# Patient Record
Sex: MALE | URBAN - METROPOLITAN AREA
[De-identification: names, ages, dates, MRNs, and addresses within clinical notes are randomized per-mention and may not be internally consistent; named-entity substitution may affect disease eponyms.]

---

## 2019-07-03 ENCOUNTER — OFFICE VISIT (OUTPATIENT)
Dept: URBAN - METROPOLITAN AREA CLINIC 40 | Facility: CLINIC | Age: 49
End: 2019-07-03
Payer: COMMERCIAL

## 2019-07-03 DIAGNOSIS — H52.4 PRESBYOPIA: Primary | ICD-10-CM

## 2019-07-03 PROCEDURE — 92004 COMPRE OPH EXAM NEW PT 1/>: CPT | Performed by: OPTOMETRIST

## 2019-07-03 ASSESSMENT — KERATOMETRY
OS: 42.00
OD: 42.50

## 2019-07-03 ASSESSMENT — VISUAL ACUITY
OD: 20/20
OS: 20/20

## 2019-07-03 ASSESSMENT — INTRAOCULAR PRESSURE
OS: 12
OD: 12

## 2021-05-20 ENCOUNTER — OFFICE VISIT (OUTPATIENT)
Dept: URBAN - METROPOLITAN AREA CLINIC 40 | Facility: CLINIC | Age: 51
End: 2021-05-20
Payer: COMMERCIAL

## 2021-05-20 PROCEDURE — 92014 COMPRE OPH EXAM EST PT 1/>: CPT | Performed by: OPTOMETRIST

## 2021-05-20 ASSESSMENT — VISUAL ACUITY
OD: 20/20
OS: 20/20

## 2021-05-20 ASSESSMENT — INTRAOCULAR PRESSURE
OD: 13
OS: 12

## 2022-11-16 ENCOUNTER — OFFICE VISIT (OUTPATIENT)
Dept: URBAN - METROPOLITAN AREA CLINIC 40 | Facility: CLINIC | Age: 52
End: 2022-11-16
Payer: COMMERCIAL

## 2022-11-16 DIAGNOSIS — H52.4 PRESBYOPIA: Primary | ICD-10-CM

## 2022-11-16 PROCEDURE — 92014 COMPRE OPH EXAM EST PT 1/>: CPT | Performed by: OPTOMETRIST

## 2022-11-16 ASSESSMENT — VISUAL ACUITY
OS: 20/20
OD: 20/20

## 2022-11-16 ASSESSMENT — INTRAOCULAR PRESSURE
OD: 12
OS: 12

## 2022-11-16 ASSESSMENT — KERATOMETRY
OD: 42.13
OS: 41.50

## 2023-12-13 ENCOUNTER — OFFICE VISIT (OUTPATIENT)
Dept: URBAN - METROPOLITAN AREA CLINIC 40 | Facility: CLINIC | Age: 53
End: 2023-12-13
Payer: COMMERCIAL

## 2023-12-13 DIAGNOSIS — H52.4 PRESBYOPIA: Primary | ICD-10-CM

## 2023-12-13 PROCEDURE — 92014 COMPRE OPH EXAM EST PT 1/>: CPT | Performed by: OPTOMETRIST

## 2023-12-13 ASSESSMENT — KERATOMETRY
OD: 41.63
OS: 41.38

## 2023-12-13 ASSESSMENT — VISUAL ACUITY
OD: 20/20
OS: 20/20

## 2023-12-13 ASSESSMENT — INTRAOCULAR PRESSURE
OD: 16
OS: 16

## 2024-04-03 NOTE — IMPRESSION/PLAN
Impression: Presbyopia: H52.4. Plan: Discussed diagnosis in detail with patient. New glasses Rx was given today. Recommend UV protection. Potential for initial adaptation discussed.
february 2024

## 2025-03-26 ENCOUNTER — OFFICE VISIT (OUTPATIENT)
Dept: URBAN - METROPOLITAN AREA CLINIC 40 | Facility: CLINIC | Age: 55
End: 2025-03-26
Payer: COMMERCIAL

## 2025-03-26 DIAGNOSIS — H52.4 PRESBYOPIA: Primary | ICD-10-CM

## 2025-03-26 PROCEDURE — 92014 COMPRE OPH EXAM EST PT 1/>: CPT | Performed by: OPTOMETRIST

## 2025-03-26 ASSESSMENT — KERATOMETRY
OD: 41.63
OS: 42.00

## 2025-03-26 ASSESSMENT — INTRAOCULAR PRESSURE
OS: 16
OD: 17

## 2025-03-26 ASSESSMENT — VISUAL ACUITY
OS: 20/20
OD: 20/20

## 2025-05-07 ENCOUNTER — OFFICE VISIT (OUTPATIENT)
Dept: URBAN - METROPOLITAN AREA CLINIC 40 | Facility: CLINIC | Age: 55
End: 2025-05-07
Payer: COMMERCIAL

## 2025-05-07 DIAGNOSIS — H52.4 PRESBYOPIA: Primary | ICD-10-CM

## 2025-05-07 PROCEDURE — 92015 DETERMINE REFRACTIVE STATE: CPT | Performed by: OPTOMETRIST

## 2025-05-07 ASSESSMENT — VISUAL ACUITY
OD: 20/20
OS: 20/20